# Patient Record
Sex: MALE | Race: WHITE | NOT HISPANIC OR LATINO | Employment: FULL TIME | URBAN - NONMETROPOLITAN AREA
[De-identification: names, ages, dates, MRNs, and addresses within clinical notes are randomized per-mention and may not be internally consistent; named-entity substitution may affect disease eponyms.]

---

## 2019-01-11 ENCOUNTER — OFFICE VISIT (OUTPATIENT)
Dept: FAMILY MEDICINE CLINIC | Facility: CLINIC | Age: 24
End: 2019-01-11

## 2019-01-11 VITALS
TEMPERATURE: 98.4 F | RESPIRATION RATE: 18 BRPM | SYSTOLIC BLOOD PRESSURE: 110 MMHG | DIASTOLIC BLOOD PRESSURE: 70 MMHG | OXYGEN SATURATION: 99 % | HEIGHT: 70 IN | HEART RATE: 64 BPM | WEIGHT: 163.2 LBS | BODY MASS INDEX: 23.37 KG/M2

## 2019-01-11 DIAGNOSIS — R10.9 ABDOMINAL PAIN, UNSPECIFIED ABDOMINAL LOCATION: ICD-10-CM

## 2019-01-11 DIAGNOSIS — R11.2 NON-INTRACTABLE VOMITING WITH NAUSEA, UNSPECIFIED VOMITING TYPE: ICD-10-CM

## 2019-01-11 DIAGNOSIS — E53.8 B12 DEFICIENCY: ICD-10-CM

## 2019-01-11 DIAGNOSIS — R79.89 ELEVATED FERRITIN: ICD-10-CM

## 2019-01-11 DIAGNOSIS — F41.9 ANXIETY: Primary | ICD-10-CM

## 2019-01-11 PROBLEM — R63.4 ABNORMAL LOSS OF WEIGHT: Status: ACTIVE | Noted: 2019-01-11

## 2019-01-11 PROCEDURE — 99203 OFFICE O/P NEW LOW 30 MIN: CPT | Performed by: PHYSICIAN ASSISTANT

## 2019-01-11 NOTE — PATIENT INSTRUCTIONS
"23 year old male who presents today in follow up of chronic nausea with vomiting and abdominal pain as well as chronic depression and anxiety. He had significant, debilitating GI issues with pediatric GI workup including endoscopy over 3 years ago. He was started on Zoloft by Dr Montanez, which improved his GI symptoms and helped his anxiety and depression. he stopped this medication to allow him to be accepted into the . He has had worsening symptoms since being in the  off medication. He was also seen at ER for symptoms and was given nausea medication and an \"acid\" medication. Patient to call when he gets home to let me know what medications he was given. If H2 blocker or PPI, I will have him take 30 minutes before dinner- do not eat, drink, smoke, or take medications for 30 minutes. I will also refer to GI to workup chronic GI issues. He did well in the past on Zoloft. I will restart Zoloft 50 mg 1/2 tablet at bedtime for 3-7 days and then can increase to 50 mg 1 tablet at bedtime. To call or return if worsening moods or AE with medication. 9-1-1 to ER if develops SI/HI. Otherwise, to follow up in 1 month on Zoloft.     We will obtain records from [facility] and we will likely need to complete further workup/ labs, however, I am not sure what was performed, so I will need to review records before ordering. Avoid Monster or energy drinks. Avoid carbonated beverages. May drink 1 caffienated beverage only if necessary. Avoid ETOH.   "

## 2019-01-11 NOTE — PROGRESS NOTES
"Subjective   Timmy Rutledge is a 23 y.o. male. Patient here today for follow-up Urgent care for vomiting, nausea and abdominal pain     History of Present Illness     States he is in the [employer].     Was hospitalized 2015 with CT scan and EGD- negative- they recommended bowel clean out. Then went to Dr Romeo Montanez here and was given Zoloft, which seemed to help. He was to follow up in 3 months and did not return. 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 Reports medication did help.     Went to [facility] [place] and was sent to ER. Went to [facility] and they took labs and urine and sent to the lab. They d/c and told was dehydrated and gave Rx for nausea and for stomach acid. They advised to see PCP. Took nausea medication before coming here but has not taken the \"stomach acid\" medication.     Smoker- 1/2 PPD- started at age 22. Prior used oral tobacco- 1 can per day or more. Stopped oral tobacco. ETOH- 2 drinks per week. Stopped THC 10/2017. Has not used since. No other drugs.     Symptoms improved with Zoloft then slowly started to come back with training. Joined [employer] 10/2017 and symptoms restarted early 2018. Has not been seen since that time with the exception of [facility] and ER.     Feeling anxious most of the time. States feels depressed sometimes. Started with initial symptoms during senior year when engaged and she broke off engagement. No relationship with her currently. Then was in a relationship about a year ago and broke up about 1 month ago. 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 No thoughts of self harm or harming others. Poor sleep pattern- had this as well before. No Hx of self harm or SI.     Vomiting in the morning or if something happens in the day and gets stressed out. Mornings are always the worst. No symptoms in the middle of the night. Nausea associated - reports got light-headed and \"almost passed out\" and had " increased abdominal pain. Normal BM- once daily. Normal consistency- no straining. No constipation or diarrhea. No blood in stool or emesis. No other symptoms that he can think of.     The following portions of the patient's history were reviewed and updated as appropriate: allergies, current medications, past family history, past medical history, past social history, past surgical history and problem list.    Review of Systems   Gastrointestinal: Positive for abdominal pain, nausea and vomiting.   Psychiatric/Behavioral: Positive for dysphoric mood. The patient is nervous/anxious.    All other systems reviewed and are negative.      Objective   Physical Exam   Constitutional: He is oriented to person, place, and time. He appears well-developed and well-nourished. No distress.   HENT:   Head: Normocephalic and atraumatic.   Right Ear: External ear normal.   Left Ear: External ear normal.   Eyes: Conjunctivae are normal.   Neck: No JVD present. Carotid bruit is not present. No tracheal deviation present. No thyroid mass and no thyromegaly present.   Cardiovascular: Normal rate, regular rhythm, normal heart sounds and intact distal pulses.   Pulmonary/Chest: Effort normal and breath sounds normal. No stridor. No respiratory distress. He has no wheezes. He has no rales.   Abdominal: Soft. Normal appearance and bowel sounds are normal. He exhibits no shifting dullness, no distension, no pulsatile liver, no fluid wave, no abdominal bruit, no ascites, no pulsatile midline mass and no mass. There is no hepatosplenomegaly. There is no tenderness. There is no rigidity, no rebound, no guarding, no CVA tenderness, no tenderness at McBurney's point and negative Galdamez's sign.   Neurological: He is alert and oriented to person, place, and time. Gait normal.   Skin: Skin is warm and dry. He is not diaphoretic.   Psychiatric: He has a normal mood and affect. His behavior is normal. Judgment and thought content normal.   Nursing  "note and vitals reviewed.      Assessment/Plan   Timmy was seen today for follow-up.    Diagnoses and all orders for this visit:    Anxiety    B12 deficiency    Elevated ferritin    Non-intractable vomiting with nausea, unspecified vomiting type  -     Ambulatory Referral to Gastroenterology    Abdominal pain, unspecified abdominal location  -     Ambulatory Referral to Gastroenterology    Other orders  -     sertraline (ZOLOFT) 50 MG tablet; Take 1 tablet by mouth Daily.      Patient Instructions   23 year old male who presents today in follow up of chronic nausea with vomiting and abdominal pain as well as chronic depression and anxiety. He had significant, debilitating GI issues with pediatric GI workup including endoscopy over 3 years ago. He was started on Zoloft by Dr Montanez, which improved his GI symptoms and helped his anxiety and depression. he stopped this medication to allow him to be accepted into the . He has had worsening symptoms since being in the  off medication. He was also seen at ER for symptoms and was given nausea medication and an \"acid\" medication. Patient to call when he gets home to let me know what medications he was given. If H2 blocker or PPI, I will have him take 30 minutes before dinner- do not eat, drink, smoke, or take medications for 30 minutes. I will also refer to GI to workup chronic GI issues. He did well in the past on Zoloft. I will restart Zoloft 50 mg 1/2 tablet at bedtime for 3-7 days and then can increase to 50 mg 1 tablet at bedtime. To call or return if worsening moods or AE with medication. 9-1-1 to ER if develops SI/HI. Otherwise, to follow up in 1 month on Zoloft.     We will obtain records from [facility] and we will likely need to complete further workup/ labs, however, I am not sure what was performed, so I will need to review records before ordering. Avoid Monster or energy drinks. Avoid carbonated beverages. May drink 1 caffienated " beverage only if necessary. Avoid ETOH.

## 2019-01-16 ENCOUNTER — TELEPHONE (OUTPATIENT)
Dept: FAMILY MEDICINE CLINIC | Facility: CLINIC | Age: 24
End: 2019-01-16

## 2019-01-16 NOTE — TELEPHONE ENCOUNTER
Patients mother called stating this patient was seen on 1/11/2019 and he was supposed to have a script for zoloft sent to his pharmacy but they never received it. Looks like there is no zoloft on his med list. Are you wanting to start him on this? Please advise, thanks.

## 2019-04-29 ENCOUNTER — OFFICE VISIT (OUTPATIENT)
Dept: FAMILY MEDICINE CLINIC | Facility: CLINIC | Age: 24
End: 2019-04-29

## 2019-04-29 VITALS
HEIGHT: 69 IN | RESPIRATION RATE: 16 BRPM | DIASTOLIC BLOOD PRESSURE: 68 MMHG | TEMPERATURE: 98.7 F | HEART RATE: 72 BPM | WEIGHT: 160 LBS | SYSTOLIC BLOOD PRESSURE: 108 MMHG | OXYGEN SATURATION: 98 % | BODY MASS INDEX: 23.7 KG/M2

## 2019-04-29 DIAGNOSIS — F41.9 ANXIETY: Primary | ICD-10-CM

## 2019-04-29 DIAGNOSIS — R79.89 ELEVATED FERRITIN: ICD-10-CM

## 2019-04-29 DIAGNOSIS — R11.2 NON-INTRACTABLE VOMITING WITH NAUSEA, UNSPECIFIED VOMITING TYPE: ICD-10-CM

## 2019-04-29 DIAGNOSIS — E53.8 B12 DEFICIENCY: ICD-10-CM

## 2019-04-29 PROCEDURE — 99213 OFFICE O/P EST LOW 20 MIN: CPT | Performed by: PHYSICIAN ASSISTANT

## 2019-04-29 NOTE — PROGRESS NOTES
Subjective   Timmy Rutledge is a 24 y.o. male. Presents today for medication management for depression with anxiety. Also would like to discuss weakness and dizziness x 2 days.     History of Present Illness     Initially medication worked well with starting it. Then occasionally would have some evenings that would feel like some days, he would have more stress/ worry and medication seemed to wear off. Has been off medication for 1-2 weeks.     Weakness and dizziness- started a couple days ago, states he was ok then had to vomit and felt weak and poorly.  Symptoms started within the first week of running out of his Zoloft.  Patient is not sure if this is from going to drill, which he reports stresses him out or if it is from running out of medication.    When started running out, he cut back to 1/2 tablets daily to wean down since he was going to run out and was not going to be in today.     The following portions of the patient's history were reviewed and updated as appropriate: allergies, current medications, past family history, past medical history, past social history, past surgical history and problem list.    Review of Systems   Neurological: Positive for dizziness and weakness.   Psychiatric/Behavioral: Positive for dysphoric mood.   All other systems reviewed and are negative.      Objective   Physical Exam   Constitutional: He is oriented to person, place, and time. He appears well-developed.   HENT:   Head: Normocephalic and atraumatic.   Right Ear: External ear normal.   Left Ear: External ear normal.   Eyes: Conjunctivae are normal.   Neck: Carotid bruit is not present. No tracheal deviation present. No thyroid mass and no thyromegaly present.   Cardiovascular: Normal rate, regular rhythm, normal heart sounds and intact distal pulses.   Pulmonary/Chest: Effort normal and breath sounds normal.   Neurological: He is alert and oriented to person, place, and time. Gait normal.   Skin: Skin is warm and dry.  "  Psychiatric: He has a normal mood and affect. His behavior is normal. Judgment and thought content normal.   Nursing note and vitals reviewed.      Assessment/Plan   Timmy was seen today for med management, dizziness and fatigue.    Diagnoses and all orders for this visit:    Anxiety    B12 deficiency    Elevated ferritin    Non-intractable vomiting with nausea, unspecified vomiting type    Other orders  -     sertraline (ZOLOFT) 50 MG tablet; Take 1 tablet by mouth Daily.      Patient Instructions   24-year-old male who presents today in follow-up of anxiety.  He was seen in January and was to follow-up in 1 month from restarting his Zoloft.  Patient scheduled but did not return for 2 appointments.  He returns today in follow-up, however, he is out of medication for 1 to 2 weeks.  Patient reports medication was working well then stopped working as well.  It is unclear the timeframe that medication stopped working as well.  He still reports moods were better but he would have some wearing off effect in the afternoons only on certain days.  Patient then reports he decreased medication to half dose because he knew he would run out of medication before he was seen.  I am not certain when moods started to get worse-whether this was on full dose or half dose.  I will have patient restart Zoloft 50 mg once daily and follow-up in 2 to 3 weeks for reevaluation of his meds.  I would like to also check fasting labs to ensure no other etiology for symptoms.  Of note, patient has noticed some dizziness, vomiting, weakness and feeling poorly for the past few days.  He reports this started within a week of running out of his Zoloft.  Patient also had drill this weekend and reports having \"bad water\".  I am hoping symptoms resolve with restarting his Zoloft.  He should call or return if he has continued symptoms despite restarting Zoloft.  Further recommendations and plan pending follow-up.             "

## 2019-04-29 NOTE — PATIENT INSTRUCTIONS
"24-year-old male who presents today in follow-up of anxiety.  He was seen in January and was to follow-up in 1 month from restarting his Zoloft.  Patient scheduled but did not return for 2 appointments.  He returns today in follow-up, however, he is out of medication for 1 to 2 weeks.  Patient reports medication was working well then stopped working as well.  It is unclear the timeframe that medication stopped working as well.  He still reports moods were better but he would have some wearing off effect in the afternoons only on certain days.  Patient then reports he decreased medication to half dose because he knew he would run out of medication before he was seen.  I am not certain when moods started to get worse-whether this was on full dose or half dose.  I will have patient restart Zoloft 50 mg once daily and follow-up in 2 to 3 weeks for reevaluation of his meds.  I would like to also check fasting labs to ensure no other etiology for symptoms.  Of note, patient has noticed some dizziness, vomiting, weakness and feeling poorly for the past few days.  He reports this started within a week of running out of his Zoloft.  Patient also had drill this weekend and reports having \"bad water\".  I am hoping symptoms resolve with restarting his Zoloft.  He should call or return if he has continued symptoms despite restarting Zoloft.  Further recommendations and plan pending follow-up.  "

## 2019-04-30 ENCOUNTER — TELEPHONE (OUTPATIENT)
Dept: FAMILY MEDICINE CLINIC | Facility: CLINIC | Age: 24
End: 2019-04-30

## 2019-04-30 NOTE — TELEPHONE ENCOUNTER
Patient called said he was seen yesterday and he is still not feeling well and would like a work note for today and he would like for us to fax it to his employer [employer] fax# is [phone] and he would like a call letting us know that this has been taken care of, his best call back is [phone]

## 2019-05-23 ENCOUNTER — TELEPHONE (OUTPATIENT)
Dept: FAMILY MEDICINE CLINIC | Facility: CLINIC | Age: 24
End: 2019-05-23

## 2019-05-23 NOTE — TELEPHONE ENCOUNTER
Patient was advised to follow-up in 2 to 3 weeks from his last appointment.  He no show that appointment.  The last time I started medication and asked him to follow-up, he also no showed the follow-up appointments and ran out of medication.  I recommend he go to [facility] for urgent psychiatric evaluation and treatment.

## 2019-05-23 NOTE — TELEPHONE ENCOUNTER
Spoke w/patient told him this information, he will keep appointment on 6-, I did tell him we would call if we had a cancellation.

## 2019-05-23 NOTE — TELEPHONE ENCOUNTER
Could you call patient and let him know we are unable to get him in any sooner than his 6/11 appointment and if he needs to he can go to [facility] or [facility] for a urgent psychiatric evaluation. Thanks a bunch!

## 2019-05-23 NOTE — TELEPHONE ENCOUNTER
Patient called to make an appointment to have his anxiety medication increased, I scheduled his appointment for 6-11-19 but he is unable to come in during June, due to work, he would like something next week if possible, his best call back is [phone]

## 2019-07-01 ENCOUNTER — OFFICE VISIT (OUTPATIENT)
Dept: FAMILY MEDICINE CLINIC | Facility: CLINIC | Age: 24
End: 2019-07-01

## 2019-07-01 VITALS
TEMPERATURE: 98.1 F | SYSTOLIC BLOOD PRESSURE: 112 MMHG | BODY MASS INDEX: 23.74 KG/M2 | HEART RATE: 79 BPM | OXYGEN SATURATION: 99 % | DIASTOLIC BLOOD PRESSURE: 64 MMHG | HEIGHT: 70 IN | WEIGHT: 165.8 LBS

## 2019-07-01 DIAGNOSIS — F41.8 DEPRESSION WITH ANXIETY: Primary | ICD-10-CM

## 2019-07-01 DIAGNOSIS — R79.89 ELEVATED FERRITIN: ICD-10-CM

## 2019-07-01 DIAGNOSIS — E53.8 B12 DEFICIENCY: ICD-10-CM

## 2019-07-01 PROCEDURE — 99213 OFFICE O/P EST LOW 20 MIN: CPT | Performed by: PHYSICIAN ASSISTANT

## 2019-07-01 NOTE — PATIENT INSTRUCTIONS
24 year old male who presents today in follow up of depression with anxiety. He has been noncompliant with follow up and has had trouble with medications and uncontrolled moods. He was initially seen by me 1/11/2019 and was restarted on medication and was asked to follow up in 1 month but no show 2 appointments. He was not seen again until 4/29/19 and had been out of medication for 1-2 weeks, reporting worsening moods since running out. I restarted Zoloft 50 mg once daily and asked that he return in 2-3 weeks. He same day cancelled appt then no show another appointment. He did not return until today and again has uncontrolled moods. He was also referred to GI for chronic, intermittent GI symptoms, however, he no show this appointment as well and did not reschedule. I am unable to effectively treat patient's anxiety and depression with noncompliance. I will check labs today and will refer to psychiatry. He will need psychiatry and psychotherapy combined. Patient had significant anxiety and depression with GI symptoms prior to going into the . He stopped medication and did not report the mental illness. He reports any times of increased stress in his life worsen his anxiety and stress. I discussed with him that with increased symptoms prior to weekend or 1-2 week leave for drill/ training seem to cause increased stress and symptoms, he may think about leaving the . He reports he and significant other have already discussed this and think it may be best as well. 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 Patient will also need to schedule consult with GI as referred. Patient to go to [facility] or [facility] if worsening moods or if at any point he develops SI/HI.

## 2019-07-01 NOTE — PROGRESS NOTES
Subjective   Timmy Rutledge is a 24 y.o. male here today for follow-up anxiety/depression, states the medication isn't helping     History of Present Illness     Patient reports uncontrolled depression and anxiety.  He reports having panic attacks with most recent training.  He was gone 15 to 16 days and had increased anxiety as well as panic attacks with sweating, vomiting, shortness of breath, palpitations.  He reports he has underlying anxiety but he has the most trouble when he goes for drill or trainings.    The following portions of the patient's history were reviewed and updated as appropriate: allergies, current medications, past family history, past medical history, past social history, past surgical history and problem list.    Review of Systems   Psychiatric/Behavioral:        Anxiety/depression    All other systems reviewed and are negative.      Objective   Physical Exam   Constitutional: He is oriented to person, place, and time. He appears well-developed.   HENT:   Head: Normocephalic and atraumatic.   Right Ear: External ear normal.   Left Ear: External ear normal.   Eyes: Conjunctivae are normal.   Neck: Carotid bruit is not present. No tracheal deviation present. No thyroid mass and no thyromegaly present.   Cardiovascular: Normal rate, regular rhythm, normal heart sounds and intact distal pulses.   Pulmonary/Chest: Effort normal and breath sounds normal.   Neurological: He is alert and oriented to person, place, and time. Gait normal.   Skin: Skin is warm and dry.   Psychiatric: He has a normal mood and affect. His behavior is normal. Judgment and thought content normal.   Nursing note and vitals reviewed.      Assessment/Plan   Timmy was seen today for anxiety and depression.    Diagnoses and all orders for this visit:    Depression with anxiety  -     Ambulatory Referral to Psychiatry  -     CBC & Differential  -     Comprehensive Metabolic Panel  -     CK  -     Lipid Panel With LDL / HDL  Ratio  -     Thyroid Panel With TSH  -     Iron and TIBC  -     Ferritin  -     Vitamin B12 & Folate  -     Vitamin D 25 Hydroxy  -     Urinalysis With Microscopic - Urine, Clean Catch    B12 deficiency  -     Ambulatory Referral to Psychiatry  -     CBC & Differential  -     Comprehensive Metabolic Panel  -     CK  -     Lipid Panel With LDL / HDL Ratio  -     Thyroid Panel With TSH  -     Iron and TIBC  -     Ferritin  -     Vitamin B12 & Folate  -     Vitamin D 25 Hydroxy  -     Urinalysis With Microscopic - Urine, Clean Catch    Elevated ferritin  -     Ambulatory Referral to Psychiatry  -     CBC & Differential  -     Comprehensive Metabolic Panel  -     CK  -     Lipid Panel With LDL / HDL Ratio  -     Thyroid Panel With TSH  -     Iron and TIBC  -     Ferritin  -     Vitamin B12 & Folate  -     Vitamin D 25 Hydroxy  -     Urinalysis With Microscopic - Urine, Clean Catch      Patient Instructions   24 year old male who presents today in follow up of depression with anxiety. He has been noncompliant with follow up and has had trouble with medications and uncontrolled moods. He was initially seen by me 1/11/2019 and was restarted on medication and was asked to follow up in 1 month but no show 2 appointments. He was not seen again until 4/29/19 and had been out of medication for 1-2 weeks, reporting worsening moods since running out. I restarted Zoloft 50 mg once daily and asked that he return in 2-3 weeks. He same day cancelled appt then no show another appointment. He did not return until today and again has uncontrolled moods. He was also referred to GI for chronic, intermittent GI symptoms, however, he no show this appointment as well and did not reschedule. I am unable to effectively treat patient's anxiety and depression with noncompliance. I will check labs today and will refer to psychiatry. He will need psychiatry and psychotherapy combined. Patient had significant anxiety and depression with GI symptoms  prior to going into the . He stopped medication and did not report the mental illness. He reports any times of increased stress in his life worsen his anxiety and stress. I discussed with him that with increased symptoms prior to weekend or 1-2 week leave for drill/ training seem to cause increased stress and symptoms, he may think about leaving the . He reports he and significant other have already discussed this and think it may be best as well. 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 Patient will also need to schedule consult with GI as referred. Patient to go to [facility] or [facility] if worsening moods or if at any point he develops SI/HI.

## 2019-12-23 ENCOUNTER — OFFICE VISIT (OUTPATIENT)
Dept: FAMILY MEDICINE CLINIC | Facility: CLINIC | Age: 24
End: 2019-12-23

## 2019-12-23 VITALS
HEART RATE: 78 BPM | OXYGEN SATURATION: 98 % | BODY MASS INDEX: 23.25 KG/M2 | SYSTOLIC BLOOD PRESSURE: 114 MMHG | DIASTOLIC BLOOD PRESSURE: 64 MMHG | TEMPERATURE: 98.2 F | HEIGHT: 70 IN | WEIGHT: 162.4 LBS

## 2019-12-23 DIAGNOSIS — F41.8 DEPRESSION WITH ANXIETY: Primary | ICD-10-CM

## 2019-12-23 DIAGNOSIS — F41.0 PANIC DISORDER: ICD-10-CM

## 2019-12-23 DIAGNOSIS — E87.6 HYPOKALEMIA: ICD-10-CM

## 2019-12-23 DIAGNOSIS — R11.2 NON-INTRACTABLE VOMITING WITH NAUSEA, UNSPECIFIED VOMITING TYPE: ICD-10-CM

## 2019-12-23 PROCEDURE — 99214 OFFICE O/P EST MOD 30 MIN: CPT | Performed by: PHYSICIAN ASSISTANT

## 2019-12-23 RX ORDER — HYDROXYZINE PAMOATE 25 MG/1
25 CAPSULE ORAL 4 TIMES DAILY PRN
COMMUNITY
Start: 2019-12-19 | End: 2022-04-21

## 2019-12-23 RX ORDER — SERTRALINE HYDROCHLORIDE 100 MG/1
100 TABLET, FILM COATED ORAL DAILY
Qty: 30 TABLET | Refills: 0 | Status: SHIPPED | OUTPATIENT
Start: 2019-12-23 | End: 2020-01-17 | Stop reason: SDUPTHER

## 2019-12-23 RX ORDER — POTASSIUM CHLORIDE 750 MG/1
TABLET, FILM COATED, EXTENDED RELEASE ORAL
COMMUNITY
Start: 2019-12-19 | End: 2022-04-21

## 2019-12-23 RX ORDER — PROMETHAZINE HYDROCHLORIDE 25 MG/1
TABLET ORAL
COMMUNITY
Start: 2019-12-19 | End: 2022-04-21

## 2019-12-23 NOTE — PROGRESS NOTES
"Subjective   Timmy Rutledge is a 24 y.o. male here today for follow-up [facility] for anxiety and panic attacks     History of Present Illness     Reports 2019 he turned in a note to [employer] and they got him to a mental health specialist and has been seeing a counselor weekly at the [facility] in [place]. They gave a list of providers that could prescribe his medication but he could not afford to see them. Once he was seeing the counselor, states he has to finish his contract and cannot get out of his contract. States he spends the whole drill is spent vomiting and crying.     States he has had progressive worsening of symptoms- could not get up, was laying on the ground shaking, weak, and couldn't get up. States he got anxious, went to the bathroom to vomit and breathing worsened and he had weakness.     Went to [facility] and was diagnosed with panic attack and was advised to follow up here. Did labs and testing- urine, blood, IV fluids. No scans or EKGs. Was given potassium, phenergan, and     Was engaged and his fiance left him that day. States they sat down to talk and she told him she wasn't in love and wasn't happy. States that is what made him had initial panic attack. Reports she was the \"only stable thing I had\". States he was afraid to go to [employer] to lose anything. Now he does not want to go anywhere because he is afraid that he will not have anyone or will lose someone else. States when he thinks he is getting something right, things collapse. He cannot look at his uniform without breaking down and getting sick. Gets sick before goes to [employer].     Has had thoughts of self harm- reports he would not kill himself but moments that he drives his car as fast as he can and if he gets in MVA, it would be better. States he would not overdose, use a gun or hang himself to kill himself but thought that if he was in MVA and  it would be better.     The following portions of the " patient's history were reviewed and updated as appropriate: allergies, current medications, past family history, past medical history, past social history, past surgical history and problem list.    Review of Systems   Constitutional: Positive for appetite change, diaphoresis and fatigue.   HENT: Negative.    Respiratory: Positive for shortness of breath.    Cardiovascular: Positive for palpitations.   Gastrointestinal: Positive for abdominal pain, nausea and vomiting.   Genitourinary: Negative.    Musculoskeletal: Negative.    Neurological: Positive for tremors and weakness.   Psychiatric/Behavioral: Positive for agitation, decreased concentration, dysphoric mood and sleep disturbance. The patient is nervous/anxious.         Anxiety, panic attacks        Objective    Vitals:    12/23/19 1355   BP: 114/64   Pulse: 78   Temp: 98.2 °F (36.8 °C)   SpO2: 98%     Body mass index is 23.3 kg/m².    Physical Exam   Constitutional: He is oriented to person, place, and time. He appears well-developed.   HENT:   Head: Normocephalic and atraumatic.   Right Ear: External ear normal.   Left Ear: External ear normal.   Eyes: Conjunctivae are normal.   Neck: Carotid bruit is not present. No tracheal deviation present. No thyroid mass and no thyromegaly present.   Cardiovascular: Normal rate, regular rhythm, normal heart sounds and intact distal pulses.   Pulmonary/Chest: Effort normal and breath sounds normal.   Neurological: He is alert and oriented to person, place, and time. Gait normal.   Skin: Skin is warm and dry.   Psychiatric: His speech is normal and behavior is normal. Judgment and thought content normal. His mood appears not anxious. His affect is not angry, not blunt, not labile and not inappropriate. Cognition and memory are normal. He exhibits a depressed mood.   Nursing note and vitals reviewed.      Assessment/Plan   Timmy was seen today for follow-up.    Diagnoses and all orders for this visit:    Depression with  "anxiety  -     sertraline (ZOLOFT) 100 MG tablet; Take 1 tablet by mouth Daily.  -     TSH  -     T4, free  -     T3, Free    Panic disorder  -     sertraline (ZOLOFT) 100 MG tablet; Take 1 tablet by mouth Daily.  -     TSH  -     T4, free  -     T3, Free    Non-intractable vomiting with nausea, unspecified vomiting type  -     CBC & Differential  -     Comprehensive Metabolic Panel  -     Magnesium    Hypokalemia  -     CBC & Differential  -     Comprehensive Metabolic Panel  -     Magnesium      Patient Instructions   Assessment and plan  24-year-old male who presents today in follow-up of depression with anxiety and chronic GI symptoms including nausea and vomiting. He had significant, debilitating GI issues with pediatric GI workup including endoscopy over 3 years ago. He was started on Zoloft by Dr Montanez, which improved his GI symptoms and helped his anxiety and depression. He stopped this medication to allow him to be accepted into the . He has had worsening symptoms since being off medication.  He was initially seen by me in January 2019 after going to the ER for symptoms and was given nausea medication and an \"acid\" medication.  He was referred to GI to workup chronic GI issues. He was restarted on Zoloft 50 mg 1/2 tablet at bedtime for 3-7 days and then can increase to 50 mg 1 tablet at bedtime.  He was advised to follow-up in 1 month and did not return until 4/2019 again with uncontrolled moods.  Medication was restarted and he was to follow-up in 2 to 3 weeks for reevaluation.  He did not come for multiple appointments and was not seen in follow-up until July 2019.  7/1/2019, I reported \"I am unable to effectively treat patient's anxiety and depression with noncompliance. I will check labs today and will refer to psychiatry. He will need psychiatry and psychotherapy combined. Patient had significant anxiety and depression with GI symptoms prior to going into the . He stopped medication " "and did not report the mental illness. He reports any times of increased stress in his life worsen his anxiety and stress. I discussed with him that with increased symptoms prior to weekend or 1-2 week leave for drill/ training seem to cause increased stress and symptoms, he may think about leaving the . He reports he and significant other have already discussed this and think it may be best as well. 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 Patient will also need to schedule consult with GI as referred. Patient to go to [facility] if worsening moods or if at any point he develops SI/HI.\"    Patient was advised to follow-up in 1 month and did not return until today.  He has been seeing a counselor through the  but has not been seeing a psychiatrist because he reports he cannot afford this.  He reports talking with his psychologist and the  as well as his commanding officer about needing to get out of the  in the distress that is causing.  He was told he signed a contract and he will have to fulfill this.  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  He also was engaged in girlfriend recently broke up with him.  He states the day they broke up is when he had a panic attack and had to be taken to the emergency department-12/19/2019.  He was diagnosed with panic disorder and given Atarax 25 mg as needed.  He has been taking this regularly.  He stopped Zoloft 50 mg.  He was also given Phenergan and potassium.  I do not have ER records for review but assume he had hypokalemia since he was discharged with potassium.  I will check CBC and CMP today.  I also asked that he restart Zoloft 50 mg once daily for 1 to 2 weeks then can increase to 100 mg once daily.  He can continue Atarax as needed but understands he cannot drive, " lift, work, operate machinery, operate firearms, or go to drill on this medication.  I have given him a note to miss drill in January that he will need to reschedule for February or March once his moods are little more stable.  He should continue counseling regularly.  Follow-up with me in 3 weeks or ASAP if worsening moods.  If he develops SI/HI-she is to call 911 to ER.  Patient denies SI/HI at this time, verbally contracts for safety, and agrees to call 911 if he develops any SI/HI.    Patient was referred to GI in January 2019 and did not go for appointment.  Once we see him back for moods, we will need to ensure he is referred back to GI and keeps appointment.

## 2019-12-23 NOTE — PATIENT INSTRUCTIONS
"Assessment and plan  24-year-old male who presents today in follow-up of depression with anxiety and chronic GI symptoms including nausea and vomiting. He had significant, debilitating GI issues with pediatric GI workup including endoscopy over 3 years ago. He was started on Zoloft by Dr Montanez, which improved his GI symptoms and helped his anxiety and depression. He stopped this medication to allow him to be accepted into the . He has had worsening symptoms since being off medication.  He was initially seen by me in January 2019 after going to the ER for symptoms and was given nausea medication and an \"acid\" medication.  He was referred to GI to workup chronic GI issues. He was restarted on Zoloft 50 mg 1/2 tablet at bedtime for 3-7 days and then can increase to 50 mg 1 tablet at bedtime.  He was advised to follow-up in 1 month and did not return until 4/2019 again with uncontrolled moods.  Medication was restarted and he was to follow-up in 2 to 3 weeks for reevaluation.  He did not come for multiple appointments and was not seen in follow-up until July 2019.  7/1/2019, I reported \"I am unable to effectively treat patient's anxiety and depression with noncompliance. I will check labs today and will refer to psychiatry. He will need psychiatry and psychotherapy combined. Patient had significant anxiety and depression with GI symptoms prior to going into the . He stopped medication and did not report the mental illness. He reports any times of increased stress in his life worsen his anxiety and stress. I discussed with him that with increased symptoms prior to weekend or 1-2 week leave for drill/ training seem to cause increased stress and symptoms, he may think about leaving the . He reports he and significant other have already discussed this and think it may be best as well. I will write a letter in support, as I feel that a deployment would not be something he would handle well with his " "anxiety and depression. Patient will also need to schedule consult with GI as referred. Patient to go to [facility] if worsening moods or if at any point he develops SI/HI.\"    Patient was advised to follow-up in 1 month and did not return until today.  He has been seeing a counselor through the  but has not been seeing a psychiatrist because he reports he cannot afford this.  He reports talking with his psychologist and the  as well as his commanding officer about needing to get out of the  in the distress that is causing.  He was told he signed a contract and he will have to fulfill this.  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  He also was engaged in girlfriend recently broke up with him.  He states the day they broke up is when he had a panic attack and had to be taken to the emergency department-12/19/2019.  He was diagnosed with panic disorder and given Atarax 25 mg as needed.  He has been taking this regularly.  He stopped Zoloft 50 mg.  He was also given Phenergan and potassium.  I do not have ER records for review but assume he had hypokalemia since he was discharged with potassium.  I will check CBC and CMP today.  I also asked that he restart Zoloft 50 mg once daily for 1 to 2 weeks then can increase to 100 mg once daily.  He can continue Atarax as needed but understands he cannot drive, lift, work, operate machinery, operate firearms, or go to drill on this medication.  I have given him a note to miss drill in January that he will need to reschedule for February or March once his moods are little more stable.  He should continue counseling regularly.  Follow-up with me in 3 weeks or ASAP if worsening moods.  If he develops SI/HI-she is to call 911 to ER.  Patient denies SI/HI at this time, verbally contracts for safety, and agrees to call 911 if he develops any SI/HI.    Patient was referred to GI in " January 2019 and did not go for appointment.  Once we see him back for moods, we will need to ensure he is referred back to GI and keeps appointment.

## 2019-12-24 LAB
ALBUMIN SERPL-MCNC: 5.1 G/DL (ref 3.5–5.5)
ALBUMIN/GLOB SERPL: 1.8 {RATIO} (ref 1.2–2.2)
ALP SERPL-CCNC: 83 IU/L (ref 39–117)
ALT SERPL-CCNC: 15 IU/L (ref 0–44)
AST SERPL-CCNC: 18 IU/L (ref 0–40)
BASOPHILS # BLD AUTO: 0 X10E3/UL (ref 0–0.2)
BASOPHILS NFR BLD AUTO: 0 %
BILIRUB SERPL-MCNC: 0.6 MG/DL (ref 0–1.2)
BUN SERPL-MCNC: 14 MG/DL (ref 6–20)
BUN/CREAT SERPL: 13 (ref 9–20)
CALCIUM SERPL-MCNC: 10 MG/DL (ref 8.7–10.2)
CHLORIDE SERPL-SCNC: 103 MMOL/L (ref 96–106)
CO2 SERPL-SCNC: 21 MMOL/L (ref 20–29)
CREAT SERPL-MCNC: 1.08 MG/DL (ref 0.76–1.27)
EOSINOPHIL # BLD AUTO: 0 X10E3/UL (ref 0–0.4)
EOSINOPHIL NFR BLD AUTO: 0 %
ERYTHROCYTE [DISTWIDTH] IN BLOOD BY AUTOMATED COUNT: 12.5 % (ref 12.3–15.4)
GLOBULIN SER CALC-MCNC: 2.8 G/DL (ref 1.5–4.5)
GLUCOSE SERPL-MCNC: 85 MG/DL (ref 65–99)
HCT VFR BLD AUTO: 43.8 % (ref 37.5–51)
HGB BLD-MCNC: 16.2 G/DL (ref 13–17.7)
IMM GRANULOCYTES # BLD AUTO: 0 X10E3/UL (ref 0–0.1)
IMM GRANULOCYTES NFR BLD AUTO: 0 %
LYMPHOCYTES # BLD AUTO: 2 X10E3/UL (ref 0.7–3.1)
LYMPHOCYTES NFR BLD AUTO: 19 %
MAGNESIUM SERPL-MCNC: 2.2 MG/DL (ref 1.6–2.3)
MCH RBC QN AUTO: 32 PG (ref 26.6–33)
MCHC RBC AUTO-ENTMCNC: 37 G/DL (ref 31.5–35.7)
MCV RBC AUTO: 87 FL (ref 79–97)
MONOCYTES # BLD AUTO: 0.6 X10E3/UL (ref 0.1–0.9)
MONOCYTES NFR BLD AUTO: 5 %
MORPHOLOGY BLD-IMP: ABNORMAL
NEUTROPHILS # BLD AUTO: 7.7 X10E3/UL (ref 1.4–7)
NEUTROPHILS NFR BLD AUTO: 76 %
PLATELET # BLD AUTO: 229 X10E3/UL (ref 150–450)
POTASSIUM SERPL-SCNC: 4.1 MMOL/L (ref 3.5–5.2)
PROT SERPL-MCNC: 7.9 G/DL (ref 6–8.5)
RBC # BLD AUTO: 5.06 X10E6/UL (ref 4.14–5.8)
SODIUM SERPL-SCNC: 143 MMOL/L (ref 134–144)
T3FREE SERPL-MCNC: 3.5 PG/ML (ref 2–4.4)
T4 FREE SERPL-MCNC: 1.51 NG/DL (ref 0.82–1.77)
TSH SERPL DL<=0.005 MIU/L-ACNC: 0.86 UIU/ML (ref 0.45–4.5)
WBC # BLD AUTO: 10.3 X10E3/UL (ref 3.4–10.8)

## 2020-01-17 DIAGNOSIS — F41.8 DEPRESSION WITH ANXIETY: ICD-10-CM

## 2020-01-17 DIAGNOSIS — F41.0 PANIC DISORDER: ICD-10-CM

## 2020-01-17 RX ORDER — SERTRALINE HYDROCHLORIDE 100 MG/1
100 TABLET, FILM COATED ORAL DAILY
Qty: 30 TABLET | Refills: 0 | Status: SHIPPED | OUTPATIENT
Start: 2020-01-17 | End: 2020-02-17

## 2020-02-16 DIAGNOSIS — F41.0 PANIC DISORDER: ICD-10-CM

## 2020-02-16 DIAGNOSIS — F41.8 DEPRESSION WITH ANXIETY: ICD-10-CM

## 2020-02-17 RX ORDER — SERTRALINE HYDROCHLORIDE 100 MG/1
TABLET, FILM COATED ORAL
Qty: 30 TABLET | Refills: 0 | Status: SHIPPED | OUTPATIENT
Start: 2020-02-17 | End: 2022-05-11

## 2020-02-21 ENCOUNTER — TELEPHONE (OUTPATIENT)
Dept: FAMILY MEDICINE CLINIC | Facility: CLINIC | Age: 25
End: 2020-02-21

## 2020-02-21 NOTE — TELEPHONE ENCOUNTER
Fortunately this patient no showed his follow-up with me that was supposed to be 3 weeks after I started medication.  I did not start medication that he could not drive.  This was started by either urgent care or ER.  I advised that he could use that just during the initial couple weeks on his SSRI while we improved moods.  I gave him an excuse for January drill and advised he would need to push that back to February or March once his moods were better controlled.  He no showed his follow-up, as he has done with almost every follow-up after have started medication.  Patient will need to be discharged due to noncompliance, as I am unable to properly medicate or control his moods when he will not follow-up.  He is also been advised in the past he would need to see psychology and psychiatry.

## 2020-02-21 NOTE — TELEPHONE ENCOUNTER
Please see note.  Patient stated he has drill once a month and was told by you not to drive while on his medications. He stated he has received a doctors note for the last two months but to call us if he was called to come to drill which is what happened today.   By the time I called him he had already drove to drill. He would still like us to reach out to his superiors if it possible.   Please advise, thanks.

## 2020-02-21 NOTE — TELEPHONE ENCOUNTER
----- Message from Clover Samuels sent at 2/21/2020  8:14 AM EST -----  Contact: [phone]  Patient called said that [employer] has prescribed him some medication that he isn't supposed to drive while taking, he is in the [employer] and he is there today and they are trying to make him drive but they need a call from [employer] to tell them this information  His best call back is [phone]

## 2020-03-18 ENCOUNTER — OFFICE VISIT (OUTPATIENT)
Dept: FAMILY MEDICINE CLINIC | Facility: CLINIC | Age: 25
End: 2020-03-18

## 2020-03-18 VITALS
WEIGHT: 174.6 LBS | HEIGHT: 70 IN | DIASTOLIC BLOOD PRESSURE: 78 MMHG | RESPIRATION RATE: 18 BRPM | TEMPERATURE: 98.4 F | SYSTOLIC BLOOD PRESSURE: 126 MMHG | HEART RATE: 60 BPM | OXYGEN SATURATION: 99 % | BODY MASS INDEX: 25 KG/M2

## 2020-03-18 DIAGNOSIS — R10.9 ABDOMINAL PAIN, UNSPECIFIED ABDOMINAL LOCATION: ICD-10-CM

## 2020-03-18 DIAGNOSIS — R11.10 VOMITING AND DIARRHEA: Primary | ICD-10-CM

## 2020-03-18 DIAGNOSIS — R09.82 POSTNASAL DRIP: ICD-10-CM

## 2020-03-18 DIAGNOSIS — R05.9 COUGH: ICD-10-CM

## 2020-03-18 DIAGNOSIS — R19.7 VOMITING AND DIARRHEA: Primary | ICD-10-CM

## 2020-03-18 PROCEDURE — 99213 OFFICE O/P EST LOW 20 MIN: CPT | Performed by: PHYSICIAN ASSISTANT

## 2020-03-18 NOTE — PATIENT INSTRUCTIONS
Assessment and plan  24-year-old male who presents today with nausea and vomiting, postnasal drip and cough.  He reports he started 3 days ago with nausea then vomiting at work then developed sharp pain in stomach.  Symptoms have been intermittent.  He reports he will have a few hours that he is ok then a few hours of worsening depending on what he has eaten. Today, he has had vomiting x 3 and reports vomiting x 3-4 yesterday.  He has had diarrhea x 2 today and reports ongoing diarrhea yesterday without quantification.  Patient then reports he started with a cough a couple weeks ago but states it has been mild with yellow production with PND as well. No fever, runny nose, ear pain, or sore throat.  He denies any contact with illness, coronavirus, and travel.  His girlfriend started with similar symptoms just recently.  I advised he restrict diet as noted below.  Patient has chronic GI issues and has been referred to GI but was not seen.  I discussed treating his symptoms and that due to policies, we are unable to swab for strep and flu today.  If he has worsening, new or changing symptoms and feels he needs testing, he will need to report to an urgent care center.  He may want to call here first for recommendations.  I advised if he is unable to tolerate p.o. intake and increase fluids, he should go to the emergency department.    NO DAIRY OR GREASY FOODS FOR 1-2 WEEKS. CLEAR LIQUID DIET FOR 24-72 HOURS- AVOID RED PRODUCTS (NO RED GATORADE OR JELLO). INCREASE FLUID INTAKE WITH CLEAR FLUIDS. ADVANCE DIET TO BRAT DIET AS TOLERATED. (BANANAS, RICE OR PASTA-PLAIN, APPLES, TOAST OR CRACKERS- PLAIN) ONLY ADVANCE DIET BEYOND BRAT DIET AS TOLERATED. IF NO IMPROVEMENT, WORSENING, NEW SYMPTOMS, INABILITY TO TOLERATE INCREASED FLUIDS / INTAKE, OR INTRACTABLE VOMITING, TO BE SEEN IMMEDIATELY HERE, URGENT CARE OR ER.

## 2020-03-18 NOTE — PROGRESS NOTES
"Subjective   Timmy Rutledge is a 24 y.o. male who presents today with 3-day history of nausea and vomiting.  He then reports he has had a cough with postnasal drip for a couple weeks.     History of Present Illness     He reports started 3 days ago with nausea then vomiting at work then sharp pain in stomach. He reports intermittent. Will have a few hours that is ok then he has a few hours of worsening depending on what he has eaten. Today, vomiting x 3 this morning and and vomiting x 3-4 yesterday. Diarrhea x 2 today and yesterday reports it was \"ongoing\". Started with a cough for a couple weeks but has been mild- yellow production with PND as well. No fever, runny nose, ear pain, or sore throat.  He has not had any contact with illness.  No coronavirus contact.  No travel.  His girlfriend started with similar symptoms just recently.    The following portions of the patient's history were reviewed and updated as appropriate: allergies, current medications, past family history, past medical history, past social history, past surgical history and problem list.    Review of Systems   Constitutional: Negative.    HENT: Positive for congestion and postnasal drip.    Respiratory: Positive for cough.    Cardiovascular: Negative.    Gastrointestinal: Positive for abdominal pain, diarrhea, nausea and vomiting.   Genitourinary: Negative.    Neurological: Negative.    Psychiatric/Behavioral: Negative.        Objective   Vitals:    03/18/20 1218   BP: 126/78   Pulse: 60   Resp: 18   Temp: 98.4 °F (36.9 °C)   SpO2: 99%     Body mass index is 25.05 kg/m².    Physical Exam   Constitutional: He is oriented to person, place, and time. Vital signs are normal. He appears well-developed and well-nourished. No distress.   HENT:   Head: Normocephalic and atraumatic.   Right Ear: Tympanic membrane, external ear and ear canal normal.   Left Ear: Tympanic membrane, external ear and ear canal normal.   Nose: Nose normal.   Mouth/Throat: " Uvula is midline, oropharynx is clear and moist and mucous membranes are normal.   Eyes: Conjunctivae are normal.   Neck: Neck supple. No JVD present. No tracheal deviation present.   Cardiovascular: Normal rate, regular rhythm and normal heart sounds. Exam reveals no gallop and no friction rub.   No murmur heard.  Pulmonary/Chest: Effort normal and breath sounds normal. No stridor. No respiratory distress. He has no wheezes. He has no rhonchi. He has no rales.   Abdominal: Soft. Normal appearance and bowel sounds are normal. He exhibits no shifting dullness, no distension, no pulsatile liver, no fluid wave, no abdominal bruit, no ascites, no pulsatile midline mass and no mass. There is no hepatosplenomegaly. There is tenderness ( Mild) in the left upper quadrant. There is no rigidity, no rebound, no guarding, no CVA tenderness, no tenderness at McBurney's point and negative Galdamez's sign.   Neurological: He is alert and oriented to person, place, and time. Gait normal.   Skin: Skin is warm and dry. He is not diaphoretic.   Psychiatric: He has a normal mood and affect. His speech is normal and behavior is normal. Judgment and thought content normal. Cognition and memory are normal.   Nursing note and vitals reviewed.      Assessment/Plan   Timmy was seen today for vomiting and diarrhea.    Diagnoses and all orders for this visit:    Vomiting and diarrhea    Abdominal pain, unspecified abdominal location    Cough    Postnasal drip        Patient Instructions   Assessment and plan  24-year-old male who presents today with nausea and vomiting, postnasal drip and cough.  He reports he started 3 days ago with nausea then vomiting at work then developed sharp pain in stomach.  Symptoms have been intermittent.  He reports he will have a few hours that he is ok then a few hours of worsening depending on what he has eaten. Today, he has had vomiting x 3 and reports vomiting x 3-4 yesterday.  He has had diarrhea x 2 today  and reports ongoing diarrhea yesterday without quantification.  Patient then reports he started with a cough a couple weeks ago but states it has been mild with yellow production with PND as well. No fever, runny nose, ear pain, or sore throat.  He denies any contact with illness, coronavirus, and travel.  His girlfriend started with similar symptoms just recently.  I advised he restrict diet as noted below.  Patient has chronic GI issues and has been referred to GI but was not seen.  I discussed treating his symptoms and that due to policies, we are unable to swab for strep and flu today.  If he has worsening, new or changing symptoms and feels he needs testing, he will need to report to an urgent care center.  He may want to call here first for recommendations.  I advised if he is unable to tolerate p.o. intake and increase fluids, he should go to the emergency department.    NO DAIRY OR GREASY FOODS FOR 1-2 WEEKS. CLEAR LIQUID DIET FOR 24-72 HOURS- AVOID RED PRODUCTS (NO RED GATORADE OR JELLO). INCREASE FLUID INTAKE WITH CLEAR FLUIDS. ADVANCE DIET TO BRAT DIET AS TOLERATED. (BANANAS, RICE OR PASTA-PLAIN, APPLES, TOAST OR CRACKERS- PLAIN) ONLY ADVANCE DIET BEYOND BRAT DIET AS TOLERATED. IF NO IMPROVEMENT, WORSENING, NEW SYMPTOMS, INABILITY TO TOLERATE INCREASED FLUIDS / INTAKE, OR INTRACTABLE VOMITING, TO BE SEEN IMMEDIATELY HERE, URGENT CARE OR ER.

## 2022-04-27 ENCOUNTER — TELEPHONE (OUTPATIENT)
Dept: FAMILY MEDICINE CLINIC | Facility: CLINIC | Age: 27
End: 2022-04-27

## 2022-04-27 NOTE — TELEPHONE ENCOUNTER
Caller: Timmy Rutledge    Relationship: Self    Best call back number: [phone]    Who are you requesting to speak with (clinical staff, provider,  specific staff member): KENIA MARADIAGA OR MA    What was the call regarding: PATIENT STATES HE WENT TO URGENT CARE ON Thursday 4/21/22 WITH FEVER, CHILLS, AND SORE THROAT. RETURNED TO URGENT CARE ON Saturday 4/23/22 WITH THE SAME SYMPTOMS, AND WAS GIVEN AN ANTIBIOTIC AND STEROIDS. HE IS STILL EXPERIENCING FEVER AND SORE THROAT, AND REQUESTS A CALL BACK TO DISCUSS FURTHER TREATMENT    Do you require a callback: YES

## 2022-04-27 NOTE — TELEPHONE ENCOUNTER
Spoke with patient he is stating that he is feeling worse and still running a fever after 6 days transferred him to schedule appt

## 2022-05-11 ENCOUNTER — OFFICE VISIT (OUTPATIENT)
Dept: FAMILY MEDICINE CLINIC | Facility: CLINIC | Age: 27
End: 2022-05-11

## 2022-05-11 VITALS
HEIGHT: 68 IN | WEIGHT: 170.6 LBS | HEART RATE: 99 BPM | DIASTOLIC BLOOD PRESSURE: 75 MMHG | SYSTOLIC BLOOD PRESSURE: 125 MMHG | OXYGEN SATURATION: 99 % | BODY MASS INDEX: 25.85 KG/M2

## 2022-05-11 DIAGNOSIS — R79.89 ELEVATED FERRITIN: ICD-10-CM

## 2022-05-11 DIAGNOSIS — R59.1 LYMPHADENOPATHY: ICD-10-CM

## 2022-05-11 DIAGNOSIS — F41.9 ANXIETY: ICD-10-CM

## 2022-05-11 DIAGNOSIS — R11.2 NAUSEA AND VOMITING, UNSPECIFIED VOMITING TYPE: ICD-10-CM

## 2022-05-11 DIAGNOSIS — J02.9 PHARYNGITIS, UNSPECIFIED ETIOLOGY: Primary | ICD-10-CM

## 2022-05-11 DIAGNOSIS — E53.8 B12 DEFICIENCY: ICD-10-CM

## 2022-05-11 PROCEDURE — 99213 OFFICE O/P EST LOW 20 MIN: CPT | Performed by: PHYSICIAN ASSISTANT

## 2022-05-11 RX ORDER — SERTRALINE HYDROCHLORIDE 25 MG/1
25 TABLET, FILM COATED ORAL DAILY
Qty: 45 TABLET | Refills: 0 | Status: SHIPPED | OUTPATIENT
Start: 2022-05-11 | End: 2022-06-09

## 2022-05-11 NOTE — PROGRESS NOTES
Subjective   Timmy Rutledge is a 27 y.o. male who presents today.     History of Present Illness         The following portions of the patient's history were reviewed and updated as appropriate: allergies, current medications, past family history, past medical history, past social history, past surgical history and problem list.    Review of Systems    Objective   Vitals:    05/11/22 1127   BP: 125/75   Pulse: 99   SpO2: 99%     Body mass index is 25.94 kg/m².    Physical Exam    Assessment & Plan   There are no diagnoses linked to this encounter.    Assessment and Plan      I spent 35 minutes caring for Timmy Rutledge on this date of service. This time includes time spent by me in the following activities as necessary: preparing for the visit, reviewing tests, specialists records and previous visits, obtaining and/or reviewing a separately obtained history, performing a medically appropriate exam and/or evaluation, counseling and educating the patient, family, caregiver, referring and/or communicating with other healthcare professionals, documenting information in the medical record, independently interpreting results and communicating that information with the patient, family, caregiver, and developing a medically appropriate treatment plan with consideration of other conditions, medications, and treatments.

## 2022-05-11 NOTE — PROGRESS NOTES
Subjective   Timmy Rutledge is a 27 y.o. male who presents today for evaluation of sore throat x 3 weeks and oral lesions.     History of Present Illness     Started 3 weeks ago with sore throat. Negative strep, covid. Then had spots on the back of his throat and he went back and was given Omnicef 300 mg twice daily and Prednisone. No longer sore throat but still spots on the throat. For 10 days, he had fever to 101.3. he was taking fever reducing medications. Now, he has a cough but did not have a cough initially. Has been > 1 week since he has had fever.   Mild ear pain in the beginning that resolved. He had LN were swollen- has improved with treatment.     He has been working the past week.     The following portions of the patient's history were reviewed and updated as appropriate: allergies, current medications, past family history, past medical history, past social history, past surgical history and problem list.    Review of Systems   Constitutional: Positive for fever.   HENT: Positive for sore throat.         Oral lesions   Gastrointestinal: Positive for nausea and vomiting.   Psychiatric/Behavioral: Positive for agitation and dysphoric mood. The patient is nervous/anxious.        Objective   Vitals:    05/11/22 1127   BP: 125/75   Pulse: 99   SpO2: 99%     Body mass index is 25.94 kg/m².    Physical Exam  Vitals and nursing note reviewed.   Constitutional:       Appearance: He is well-developed.   HENT:      Head: Normocephalic and atraumatic.      Right Ear: Tympanic membrane, ear canal and external ear normal.      Left Ear: Tympanic membrane, ear canal and external ear normal.      Nose: Nose normal.      Mouth/Throat:      Pharynx: Uvula midline.   Eyes:      Conjunctiva/sclera: Conjunctivae normal.   Cardiovascular:      Rate and Rhythm: Normal rate and regular rhythm.      Heart sounds: Normal heart sounds. No murmur heard.    No friction rub. No gallop.   Pulmonary:      Effort: Pulmonary effort is  normal.      Breath sounds: Normal breath sounds. No wheezing, rhonchi or rales.   Musculoskeletal:      Cervical back: Neck supple.   Skin:     General: Skin is warm and dry.   Neurological:      Mental Status: He is alert and oriented to person, place, and time.   Psychiatric:         Speech: Speech normal.         Behavior: Behavior normal.         Thought Content: Thought content normal.         Judgment: Judgment normal.         Assessment & Plan   Diagnoses and all orders for this visit:    1. Pharyngitis, unspecified etiology (Primary)  -     CBC & Differential  -     Comprehensive Metabolic Panel  -     HIV-1 / O / 2 Ag / Antibody 4th Generation  -     Cytomegalovirus Antibody, IgG  -     Cytomegalovirus Antibody, IgM  -     Bhavin-Barr Virus VCA, IgM  -     Bhavin-Barr Virus Early Antigen Antibody, IgG  -     EBV Antibody VCA, IgG  -     Bhavin-Barr Virus Nuclear Antigen Antibody, IgG    2. B12 deficiency  -     CBC & Differential  -     Lipid Panel With LDL / HDL Ratio  -     Vitamin B12 & Folate  -     Vitamin D 25 Hydroxy  -     TSH  -     T4, free  -     T3, Free    3. Elevated ferritin  -     CBC & Differential  -     Lipid Panel With LDL / HDL Ratio  -     Iron and TIBC  -     Ferritin    4. Anxiety  -     CBC & Differential  -     Comprehensive Metabolic Panel  -     Iron and TIBC  -     Ferritin  -     Vitamin B12 & Folate  -     Vitamin D 25 Hydroxy  -     TSH  -     T4, free  -     T3, Free  -     sertraline (Zoloft) 25 MG tablet; Take 1 tablet by mouth Daily. May increase to 50 mg in 2 weeks if needed  Dispense: 45 tablet; Refill: 0    5. Nausea and vomiting, unspecified vomiting type  -     Comprehensive Metabolic Panel  -     Hemoglobin A1c  -     Cytomegalovirus Antibody, IgG  -     Cytomegalovirus Antibody, IgM  -     Bhavin-Barr Virus VCA, IgM  -     Bhavin-Barr Virus Early Antigen Antibody, IgG  -     EBV Antibody VCA, IgG  -     Bhavin-Barr Virus Nuclear Antigen Antibody, IgG    6.  Lymphadenopathy  -     CBC & Differential  -     Comprehensive Metabolic Panel  -     HIV-1 / O / 2 Ag / Antibody 4th Generation  -     Cytomegalovirus Antibody, IgG  -     Cytomegalovirus Antibody, IgM  -     Bhavin-Barr Virus VCA, IgM  -     Bhavin-Barr Virus Early Antigen Antibody, IgG  -     EBV Antibody VCA, IgG  -     Bhavin-Barr Virus Nuclear Antigen Antibody, IgG    Other orders  -     nystatin (MYCOSTATIN) 100,000 unit/mL suspension; Swish and spit 5 mL 4 (Four) Times a Day.  Dispense: 473 mL; Refill: 0        Assessment and Plan  Patient started 3 weeks ago with sore throat and oral lesions with fever. He was seen and given Omnicef, steroid and has not had significant improvement. I will check labs today for other causes of symptoms and will give Nystatin swish and spit to ensure no thrush. He should return if worsening, new, or changing symptoms.     Patient also has not had routine follow up of anxiety, B12 deficiency, or elevated ferritin. He has had more anxiety and has started with recurrence of nausea with vomiting that he does when he is anxious. He sohuld take Pepcid 20 mg twice daily and I will restart Zoloft, as he has been off medication and he will check labs. Patient to follow up in no more than 1 month for re-evaluation of moods and follow up of all conditions.     I spent 20 minutes caring for Timmy Rutledge on this date of service. This time includes time spent by me in the following activities as necessary: preparing for the visit, reviewing tests, specialists records and previous visits, obtaining and/or reviewing a separately obtained history, performing a medically appropriate exam and/or evaluation, counseling and educating the patient, family, caregiver, referring and/or communicating with other healthcare professionals, documenting information in the medical record, independently interpreting results and communicating that information with the patient, family, caregiver, and  developing a medically appropriate treatment plan with consideration of other conditions, medications, and treatments.

## 2022-05-12 LAB
25(OH)D3+25(OH)D2 SERPL-MCNC: 32 NG/ML (ref 30–100)
ALBUMIN SERPL-MCNC: 4.8 G/DL (ref 4.1–5.2)
ALBUMIN/GLOB SERPL: 1.8 {RATIO} (ref 1.2–2.2)
ALP SERPL-CCNC: 90 IU/L (ref 44–121)
ALT SERPL-CCNC: 19 IU/L (ref 0–44)
AST SERPL-CCNC: 28 IU/L (ref 0–40)
BASOPHILS # BLD AUTO: 0 X10E3/UL (ref 0–0.2)
BASOPHILS NFR BLD AUTO: 0 %
BILIRUB SERPL-MCNC: 0.7 MG/DL (ref 0–1.2)
BUN SERPL-MCNC: 12 MG/DL (ref 6–20)
BUN/CREAT SERPL: 12 (ref 9–20)
CALCIUM SERPL-MCNC: 10.2 MG/DL (ref 8.7–10.2)
CHLORIDE SERPL-SCNC: 101 MMOL/L (ref 96–106)
CHOLEST SERPL-MCNC: 172 MG/DL (ref 100–199)
CMV IGG SERPL IA-ACNC: <0.6 U/ML (ref 0–0.59)
CMV IGM SERPL IA-ACNC: 35.7 AU/ML (ref 0–29.9)
CO2 SERPL-SCNC: 24 MMOL/L (ref 20–29)
CREAT SERPL-MCNC: 1.01 MG/DL (ref 0.76–1.27)
EBV EA-D IGG SER-ACNC: <9 U/ML (ref 0–8.9)
EBV NA IGG SER IA-ACNC: 364 U/ML (ref 0–17.9)
EBV VCA IGG SER IA-ACNC: 321 U/ML (ref 0–17.9)
EBV VCA IGM SER IA-ACNC: 68.8 U/ML (ref 0–35.9)
EGFRCR SERPLBLD CKD-EPI 2021: 106 ML/MIN/1.73
EOSINOPHIL # BLD AUTO: 0 X10E3/UL (ref 0–0.4)
EOSINOPHIL NFR BLD AUTO: 1 %
ERYTHROCYTE [DISTWIDTH] IN BLOOD BY AUTOMATED COUNT: 12.6 % (ref 11.6–15.4)
FERRITIN SERPL-MCNC: 354 NG/ML (ref 30–400)
FOLATE SERPL-MCNC: 11 NG/ML
GLOBULIN SER CALC-MCNC: 2.6 G/DL (ref 1.5–4.5)
GLUCOSE SERPL-MCNC: 97 MG/DL (ref 65–99)
HBA1C MFR BLD: 5.4 % (ref 4.8–5.6)
HCT VFR BLD AUTO: 45.4 % (ref 37.5–51)
HDLC SERPL-MCNC: 35 MG/DL
HGB BLD-MCNC: 15.2 G/DL (ref 13–17.7)
HIV 1+2 AB+HIV1 P24 AG SERPL QL IA: NON REACTIVE
IMM GRANULOCYTES # BLD AUTO: 0 X10E3/UL (ref 0–0.1)
IMM GRANULOCYTES NFR BLD AUTO: 0 %
IRON SATN MFR SERPL: 41 % (ref 15–55)
IRON SERPL-MCNC: 112 UG/DL (ref 38–169)
LDLC SERPL CALC-MCNC: 119 MG/DL (ref 0–99)
LDLC/HDLC SERPL: 3.4 RATIO (ref 0–3.6)
LYMPHOCYTES # BLD AUTO: 1.5 X10E3/UL (ref 0.7–3.1)
LYMPHOCYTES NFR BLD AUTO: 27 %
MCH RBC QN AUTO: 30.2 PG (ref 26.6–33)
MCHC RBC AUTO-ENTMCNC: 33.5 G/DL (ref 31.5–35.7)
MCV RBC AUTO: 90 FL (ref 79–97)
MONOCYTES # BLD AUTO: 0.4 X10E3/UL (ref 0.1–0.9)
MONOCYTES NFR BLD AUTO: 7 %
NEUTROPHILS # BLD AUTO: 3.7 X10E3/UL (ref 1.4–7)
NEUTROPHILS NFR BLD AUTO: 65 %
PLATELET # BLD AUTO: 267 X10E3/UL (ref 150–450)
POTASSIUM SERPL-SCNC: 4.1 MMOL/L (ref 3.5–5.2)
PROT SERPL-MCNC: 7.4 G/DL (ref 6–8.5)
RBC # BLD AUTO: 5.03 X10E6/UL (ref 4.14–5.8)
SODIUM SERPL-SCNC: 142 MMOL/L (ref 134–144)
T3FREE SERPL-MCNC: 3.7 PG/ML (ref 2–4.4)
T4 FREE SERPL-MCNC: 1.22 NG/DL (ref 0.82–1.77)
TIBC SERPL-MCNC: 275 UG/DL (ref 250–450)
TRIGL SERPL-MCNC: 95 MG/DL (ref 0–149)
TSH SERPL DL<=0.005 MIU/L-ACNC: 1.07 UIU/ML (ref 0.45–4.5)
UIBC SERPL-MCNC: 163 UG/DL (ref 111–343)
VIT B12 SERPL-MCNC: 371 PG/ML (ref 232–1245)
VLDLC SERPL CALC-MCNC: 18 MG/DL (ref 5–40)
WBC # BLD AUTO: 5.7 X10E3/UL (ref 3.4–10.8)

## 2022-06-09 DIAGNOSIS — F41.9 ANXIETY: ICD-10-CM

## 2022-06-09 RX ORDER — SERTRALINE HYDROCHLORIDE 25 MG/1
TABLET, FILM COATED ORAL
Qty: 30 TABLET | Refills: 0 | Status: SHIPPED | OUTPATIENT
Start: 2022-06-09 | End: 2022-06-29 | Stop reason: SDUPTHER

## 2022-06-29 DIAGNOSIS — F41.9 ANXIETY: ICD-10-CM

## 2022-06-29 RX ORDER — SERTRALINE HYDROCHLORIDE 25 MG/1
25 TABLET, FILM COATED ORAL DAILY
Qty: 7 TABLET | Refills: 0 | Status: SHIPPED | OUTPATIENT
Start: 2022-06-29 | End: 2022-07-11 | Stop reason: SDUPTHER

## 2022-06-29 NOTE — TELEPHONE ENCOUNTER
Caller: Timmy Rutledge    Relationship: Self    Best call back number: [phone]      Requested Prescriptions:   Requested Prescriptions     Pending Prescriptions Disp Refills   • sertraline (ZOLOFT) 25 MG tablet 30 tablet 0        Pharmacy where request should be sent: [facility][id number] - [place], [place] - [address] AT [facility] [phone]  - [phone] FX     Additional details provided by patient: PATIENT IS OUT OF THIS MEDICATION. PLEASE CALL AND ADVISE    Does the patient have less than a 3 day supply:  [x] Yes  [] No    APRIL Kizzy PATHAK Rep   06/29/22 14:05 EDT

## 2022-07-11 DIAGNOSIS — F41.9 ANXIETY: ICD-10-CM

## 2022-07-11 RX ORDER — SERTRALINE HYDROCHLORIDE 25 MG/1
25 TABLET, FILM COATED ORAL DAILY
Qty: 7 TABLET | Refills: 0 | Status: SHIPPED | OUTPATIENT
Start: 2022-07-11 | End: 2022-09-08 | Stop reason: SDUPTHER

## 2022-08-23 ENCOUNTER — TELEMEDICINE (OUTPATIENT)
Dept: FAMILY MEDICINE CLINIC | Facility: CLINIC | Age: 27
End: 2022-08-23

## 2022-08-23 DIAGNOSIS — F41.9 ANXIETY: Primary | ICD-10-CM

## 2022-08-23 PROCEDURE — 99214 OFFICE O/P EST MOD 30 MIN: CPT | Performed by: PHYSICIAN ASSISTANT

## 2022-08-23 RX ORDER — HYDROXYZINE HYDROCHLORIDE 10 MG/1
10 TABLET, FILM COATED ORAL DAILY PRN
Qty: 30 TABLET | Refills: 0 | Status: SHIPPED | OUTPATIENT
Start: 2022-08-23

## 2022-08-23 RX ORDER — ARIPIPRAZOLE 2 MG/1
2 TABLET ORAL NIGHTLY
Qty: 30 TABLET | Refills: 0 | Status: SHIPPED | OUTPATIENT
Start: 2022-08-23

## 2022-09-08 DIAGNOSIS — F41.9 ANXIETY: ICD-10-CM

## 2022-09-08 RX ORDER — SERTRALINE HYDROCHLORIDE 25 MG/1
25 TABLET, FILM COATED ORAL DAILY
Qty: 30 TABLET | Refills: 3 | Status: SHIPPED | OUTPATIENT
Start: 2022-09-08

## 2023-07-13 ENCOUNTER — TELEPHONE (OUTPATIENT)
Dept: FAMILY MEDICINE CLINIC | Facility: CLINIC | Age: 28
End: 2023-07-13

## 2023-07-17 NOTE — TELEPHONE ENCOUNTER
Caller: Timmy Rutledge    Relationship: Self    Best call back number: [phone]    What is the best time to reach you: ANYTIME, AS SOON AS POSSIBLE     Who are you requesting to speak with (clinical staff, provider,  specific staff member): SHANTEL MARADIAGA     What was the call regarding: PATIENT STATES HE IS REQUESTING TO SPEAK WITH SHANTEL MARADIAGA AS SOON AS POSSIBLE.      
I attempted to call as well. Please let him know I am out of the office for the week. Please see if he can give more information to try to help with any concerns he may be having or schedule appt for next week when I am back in office.   
I attempted to contact him again but he did not answer  
I tried to call patient but he did not answer. LMTRC - KL   
Please see if we can get any information regarding this, as I am out all next week.  I will try to get back with him this afternoon, however, I am still with patients.  
Spoke with patient, he would not speak to me. I told him you were out of the office today and he said he would call back tomorrow. I tried to help him and he still refused to speak to me. I told him you had a full scheduled tomorrow and again I asked if I could help and he said never mind and hung up  
Anemia